# Patient Record
Sex: FEMALE | Race: WHITE | HISPANIC OR LATINO | Employment: FULL TIME | URBAN - METROPOLITAN AREA
[De-identification: names, ages, dates, MRNs, and addresses within clinical notes are randomized per-mention and may not be internally consistent; named-entity substitution may affect disease eponyms.]

---

## 2017-10-04 ENCOUNTER — OFFICE VISIT (OUTPATIENT)
Dept: URGENT CARE | Facility: PHYSICIAN GROUP | Age: 52
End: 2017-10-04
Payer: COMMERCIAL

## 2017-10-04 ENCOUNTER — HOSPITAL ENCOUNTER (EMERGENCY)
Facility: MEDICAL CENTER | Age: 52
End: 2017-10-04
Attending: EMERGENCY MEDICINE
Payer: COMMERCIAL

## 2017-10-04 ENCOUNTER — APPOINTMENT (OUTPATIENT)
Dept: RADIOLOGY | Facility: MEDICAL CENTER | Age: 52
End: 2017-10-04
Attending: EMERGENCY MEDICINE
Payer: COMMERCIAL

## 2017-10-04 VITALS
SYSTOLIC BLOOD PRESSURE: 170 MMHG | HEART RATE: 87 BPM | BODY MASS INDEX: 23.36 KG/M2 | RESPIRATION RATE: 18 BRPM | OXYGEN SATURATION: 96 % | WEIGHT: 111.77 LBS | TEMPERATURE: 98.6 F | DIASTOLIC BLOOD PRESSURE: 98 MMHG

## 2017-10-04 VITALS
WEIGHT: 113 LBS | TEMPERATURE: 98.7 F | HEIGHT: 58 IN | DIASTOLIC BLOOD PRESSURE: 114 MMHG | BODY MASS INDEX: 23.72 KG/M2 | HEART RATE: 79 BPM | RESPIRATION RATE: 16 BRPM | SYSTOLIC BLOOD PRESSURE: 152 MMHG | OXYGEN SATURATION: 99 %

## 2017-10-04 DIAGNOSIS — R51.9 NEW ONSET HEADACHE: ICD-10-CM

## 2017-10-04 DIAGNOSIS — R03.0 TRANSIENT ELEVATED BLOOD PRESSURE: ICD-10-CM

## 2017-10-04 DIAGNOSIS — I16.0 HYPERTENSIVE URGENCY: ICD-10-CM

## 2017-10-04 DIAGNOSIS — R51.9 NONINTRACTABLE HEADACHE, UNSPECIFIED CHRONICITY PATTERN, UNSPECIFIED HEADACHE TYPE: ICD-10-CM

## 2017-10-04 PROCEDURE — 700111 HCHG RX REV CODE 636 W/ 250 OVERRIDE (IP): Performed by: EMERGENCY MEDICINE

## 2017-10-04 PROCEDURE — 99203 OFFICE O/P NEW LOW 30 MIN: CPT | Performed by: NURSE PRACTITIONER

## 2017-10-04 PROCEDURE — 99284 EMERGENCY DEPT VISIT MOD MDM: CPT

## 2017-10-04 PROCEDURE — 70450 CT HEAD/BRAIN W/O DYE: CPT

## 2017-10-04 PROCEDURE — 96375 TX/PRO/DX INJ NEW DRUG ADDON: CPT

## 2017-10-04 PROCEDURE — 96374 THER/PROPH/DIAG INJ IV PUSH: CPT

## 2017-10-04 RX ORDER — ONDANSETRON 2 MG/ML
4 INJECTION INTRAMUSCULAR; INTRAVENOUS ONCE
Status: COMPLETED | OUTPATIENT
Start: 2017-10-04 | End: 2017-10-04

## 2017-10-04 RX ADMIN — ONDANSETRON 4 MG: 2 INJECTION INTRAMUSCULAR; INTRAVENOUS at 13:59

## 2017-10-04 RX ADMIN — HYDROMORPHONE HYDROCHLORIDE 1 MG: 1 INJECTION, SOLUTION INTRAMUSCULAR; INTRAVENOUS; SUBCUTANEOUS at 13:59

## 2017-10-04 ASSESSMENT — ENCOUNTER SYMPTOMS
SORE THROAT: 0
NAUSEA: 1
COUGH: 0
DIZZINESS: 0
SENSORY CHANGE: 0
VOMITING: 0
SEIZURES: 0
SINUS PRESSURE: 0
BLURRED VISION: 0
CHILLS: 0
PHOTOPHOBIA: 0
SPEECH CHANGE: 1
FEVER: 0
HEADACHES: 1
RHINORRHEA: 1
MIGRAINE HEADACHES: 0

## 2017-10-04 ASSESSMENT — LIFESTYLE VARIABLES: DO YOU DRINK ALCOHOL: NO

## 2017-10-04 NOTE — DISCHARGE INSTRUCTIONS
Hipertensión arterial  (Arterial Hypertension)  La hipertensión arterial (presión sanguínea elevada) es un trastorno que consiste en la elevación de la presión dentro de loly vasos sanguíneos. La hipertensión, con el correr del tiempo, favorece el riesgo de padecer ictus, infartos o insuficiencias cardíacas. También es juan de las causas principales de insuficiencia renal (del riñón).   CAUSAS  · En adultos  Más del 90% de todos los casos de hipertensión no tienen causas conocidas. [facility] se denomina hipertensión esencial o primaria. En el 10% restante de personas con hipertensión, el aumento en la presión arterial es causado por otras enfermedades. [facility] se denomina hipertensión secundaria. LAS CAUSAS MÁS IMPORTANTES DE HIPERTENSIÓN SECUDARIA SON:  · Abuso en el consumo de bebidas alcohólicas.  · Apnea del sueño obstructiva.  · Hiperaldosteronismo (Síndrome de [place]).  · Uso de esteroides.  · Insuficiencia renal crónica.  · Hiperparatiroidismo.  · Medicamentos.  · Estenosis de la arteria renal.  · Feocromocitoma.  · Enfermedad de [place].  · Coartación de la aorta.  · Crisis de esclerodermia renal.  · Regaliz (en cantidades excesivas).  · Drogas (cocaína, metanfetamina).  El profesional que lo asiste le explicará los puntos mencionados arriba que pueden estar referidos a usted.  · En niños  La hipertensión secundaria es la más común y siempre debe ser considerada radha causa.  · Embarazo  Pocas mujeres en edad de procrear tienen dena presión arterial. Sin embargo, hasta el 10% de ellas desarrollan hipertensión inducida por el embarazo. En general, esto no perjudicará a la patricia (roshni). Puede ser un síntoma de 3 complicaciones del embarazo: preeclampsia, síndrome HELLP (por loly siglas en inglés) y eclampsia. Es necesario realizar un seguimiento y el control con medicación.  SÍNTOMAS  · Esta enfermedad normalmente no produce ningún síntoma perceptible. Generalmente se descubre en un examen de rutina.  · La hipertensión  "maligna (hipertensión acelerada) es un problema (complicación) tardía de la presión arterial elevada. Puede tener los siguientes síntomas:  · Dolor de ricardo.  · Visión borrosa.  · Daño en órganos sarahi (significa que los riñones, el corazón, los pulmones y otros órganos se están dañando).  · Las situaciones de estrés pueden aumentar la presión arterial. Si juan persona con presión arterial normal tiene un aumento en daly presión en el momento en que es controlada por el profesional, se denomina \"hipertensión de guardapolvo hall\". Daly gravedad no se conoce. Puede estar relacionado con un desarrollo futuro de hipertensión, o con complicaciones de la hipertensión.  · La hipertensión generalmente se confunde con tensión intelectual, estrés y ansiedad.  DIAGNÓSTICO  El diagnóstico se realiza mediante 3 mediciones de presión sanguínea individuales. Se kristie con juan diferencia de al menos juan semana entre cada juan. Si hay un órgano dañado por causa de la hipertensión, el diagnóstico puede realizarse sin repetir las mediciones.  La hipertensión normalmente se identifica (diagnostica) si se obtienen los siguientes niveles:  · Más de 140/90 mm Hg medidos en ambos brazos, en 3 mediciones distintas, a lo austen de dos semanas.  · Más de 130/80 mm Hg debe considerarse un factor de riesgo y puede requerir tratamiento en pacientes con diabetes.  La presión arterial de más de 120/80 mm Hg se denomina \"pre-hipertensión\", aún en pacientes no diabéticos.  Para obtener juan medición precisa de la presión sanguínea, siga las siguientes indicaciones. Sea consciente de los factores que pueden alterar las mediciones de presión sanguínea.  · [place] la presión al menos juan hora luego de consumir cafeína.  · [place] la presión 30 minutos luego de fumar y sin estrés. Zulma es otro motivo para dejar de fumar: aumenta la presión arterial.  · Use un manguito de tamaño adecuado. Consulte al profesional que lo asiste si no está seguro acerca del tamaño que " "le corresponde.  · La mayor parte de los tensiómetros hogareños son automáticos. Medirán la presión sistólica y diastólica. La presión sistólica es la que se mide al comienzo de los sonidos. La presión diastólica es la presión en la que los sonidos desaparecen. Si usted es anciano, mida distintas presiones en distintas posiciones. Intente sentado, acostado o parado.  · Siéntese y descanse por al menos 5 minutos antes de jonathan las mediciones.  · No debería jonathan medicamentos radha descongestionantes (estimulantes adrenérgicos) antes de jonathan la medición. Estos se encuentran en muchos medicamentos para el resfrío.  · [place] nota de las mediciones de daly presión sanguínea e infórmeselas al profesional que lo asiste.  Si usted tiene hipertensión:  · El profesional que lo asiste podrá realizar exámenes para asegurarse de que no tenga hipertensión secundaria (rangel \"causas\", arriba).  · El profesional que lo asiste también podrá buscar síntomas de Síndrome Metabólico. También se denomina Síndrome X o Síndrome de Insulinorresistencia. Podrá tener renae síndrome si, además de hipertensión, tiene diabetes del tipo 2, obesidad abdominal, y lípidos sanguíneos anormales.  · El profesional que lo asiste tomará daly historia médica y familiar y realizará un examen físico.  · Las pruebas de diagnóstico pueden incluir exámenes de julia (de glucosa, colesterol, potasio, y función del riñón), un análisis de orina, o un electrocardiograma. Puede ser necesario realizar otras pruebas según daly jorge particular.  PREVENCIÓN  Existen algunos importantes consejos relacionados con daly estilo de venkatesh que puede adoptar para reducir loly probabilidades de desarrollar hipertensión.  · Mantenga un peso normal.  · Limite la cantidad de sal (sodio) en daly dieta.  · Ejercítese regularmente.  · Limite el consumo de alcohol.  · Lleve juan dieta con suficiente potasio. [place] consejos específicos con el profesional que lo asiste.  · Pretty un dieta DASH ( dieta que " ayuda a frenar la hipertensión por loly siglas en inglés). Esta dieta es maynor en frutas, vegetales, y productos lácteos descremados, y luis ciertos tipos de grasas.  PRONÓSTICO:  La hipertensión esencial no puede curarse. Los cambios en el estilo de venkatesh y el tratamiento médico pueden disminuir la presión sanguínea y reducir las complicaciones. El pronóstico de la hipertensión secundaria depende de la causa subyacente. Muchas personas controlan daly hipertensión con medicamentos o cambios en daly estilo de venkatesh y de esta forma pueden vivir juan venkatesh normal y saludable.   RIESGOS Y COMPLICACIONES.  Mientras la presión arterial dena en sí misma no es juan enfermedad, a menudo requiere tratamiento debido a los efectos a austen y corto plazo que tiene sobre muchos órganos. La hipertensión aumenta el riesgo de padecer:  · ACV o ictus (accidentes cerebrovasculares)  · Insuficiencia cardíaca debido a la presión arterial elevada crónica (Cardiomiopatía hipertensiva).  · Ataques cardíacos (infarto del miocardio).  · Lesión en la retina (retinopatía hipertensiva).  · Insuficiencia renal (nefropatía hipertensiva).  El profesional que lo asiste le explicará los puntos de esta lista que pueden estar referidos a usted. El tratamiento de la hipertensión puede reducir significativamente el riesgo de complicaciones.  TRATAMIENTO  · Se recomienda la pérdida de peso para los pacientes excedidos y la práctica regular de ejercicio. El buen estado físico reduce la presión arterial.  · La hipertensión leve generalmente se trata con dieta y ejercicio. Juan dieta maynor en frutas y vegetales, lácteos descremados y alimentos bajos en grasas y sal (sodio) pueden ayudar a disminuir la presión arterial. La disminución del consumo de sal disminuye la presión arterial en un tercio de las personas.  · Si es fumador, abandone el hábito.  Estos pasos son muy efectivos para reducir la presión arterial. Estas acciones a seguir son fáciles de sugerir sarah  "difíciles de llevar a cabo. La mayoría de los pacientes con hipertensión moderada o grave finalmente necesita medicamentos para retornar la presión a niveles normales. Hay varios tipos de medicamentos que se emplean en el tratamiento. Las pastillas para la presión arterial (antihipertensivos) la disminuyen a través de loly diferentes acciones. La disminución de la presión arterial en 10 mm Hg puede disminuir el riesgo de complicaciones en un 25 %.  El objetivo del tratamiento es el control efectivo de la presión arterial. [place] reducirá el riesgo de complicaciones. El profesional que lo asiste le ayudará a determinar el mejor tratamiento para usted, de acuerdo con daly estilo de venkatesh. Lo que puede ser un excelente tratamiento para juan persona, puede no serlo para otra.  INSTRUCCIONES PARA EL CUIDADO DOMICILIARIO  · NO fume.  · Adapte daly estilo de venkatseh de acuerdo a las indicaciones de la sección \"Prevención.\"  · Si está tomando medicamentos, siga las indicaciones cuidadosamente. Los medicamentos para la presión arterial deben tomarse según fueron prescritos. Saltear dosis reduce loly beneficios. También lo pone en riesgo de padecer complicaciones.  · Concurra a las consultas de seguimiento con el profesional que lo asiste, según le haya indicado.  · Si se le solicita que controle daly presión arterial en daly hogar, siga las anteriores indicaciones de la sección \"diagnóstico.\"  SOLICITE ATENCIÓN MÉDICA SI:  · Adilia que está sufriendo efectos secundarios de la medicación.  · Siente sandi de ricardo recurrentes o se siente mareado.  · Siente hinchazón en loly tobillos.  · Tiene problemas visuales.  SOLICITE ATENCIÓN MÉDICA DE INMEDIATO SI:  · Comienza súbitamente a sentir dolor o presión en el pecho, dificultad para respirar, u otros síntomas de ataque cardíaco.  · Sufre juan cefalea grave.  · Tiene síntomas de ataque cardíaco (debilidad repentina, dificultad para hablar, dificultad para caminar).  ESTÉ SEGURO QUE:   · Comprende " "las instrucciones para el dena médica.  · Controlará daly enfermedad.  · Solicitará atención médica de inmediato según las indicaciones.  Document Released: 12/18/2006 Document Revised: 03/11/2013  [employer]® Patient Information ©2014 [employer].      Dolor de ricardo, preguntas frecuentes y loly respuestas  (Headaches, Frequently Asked Questions)  CEFALEAS MIGRAÑOSAS  P: ¿Qué es la migraña? ¿Qué la ocasiona? ¿Cómo puedo tratarla?  R: En general, la migraña comienza radha un dolor apagado. Luego progresa hacia un dolor, ansley, punzante y radha un latido. Sentirá renae dolor en las sienes. Podrá sentir dolor en la parte anterior o posterior de la ricardo, o en avery o ambos lados. El dolor suele estar acompañado de juan combinación de:  · Náuseas.   · Vómitos.   · Sensibilidad a la quintin y los ruidos.   Algunas personas (un 15%) experimentan un aura (rangel abajo) antes de un ataque. La causa de la migraña se debe a reacciones químicas del cerebro. El tratamiento para la migraña puede incluir medicamentos de venta jakob. También puede incluir técnicas de autoayuda. Estas incluyen entrenamientos para la relajación y biorretroalimentación.   P: ¿Qué es un aura?  R: Alrededor del 15% de las personas con migraña tiene un \"aura\". Es juan señal de síntomas neurológicos que ocurren antes de un dolor de ricardo por migrañas. Podrá rangel líneas onduladas o irregulares, puntos o luces parpadeantes. Podrá experimentar visión de túnel o puntos ciegos en avery o ambos ojos. El aura puede incluir alucinaciones visuales o auditivas (algo que se imagina). Puede incluir trastornos en el olfato (radha olores extraños), el tacto o el gusto. Entre otros síntomas se incluyen:  · Adormecimiento.   · Sensación de hormigueo.   · Dificultad para recordar o decir la palabra correcta.   Estos episodios neurológicos pueden durar hasta 60 minutos. Los síntomas desaparecerán a medida que el dolor de ricardo comience.  P:¿Qué es un disparador?  R: Ciertos factores " "físicos o ambientales pueden llevar a \"disparar\" juan migraña. Estos son:  · Alimentos.   · Cambios hormonales.   · Clima.   · Estrés.   Es importante recordar que los disparadores son diferentes entre si. Para ayudar a prevenir ataques de migrañas, necesitará descubrir cuáles son los disparadores que le afectan. Lleve un diario sobre loly sandi de ricardo. Zulma es un buen modo para descubrir los disparadores. El diario le ayudará en el momento de hablar con el profesional acerca de daly enfermedad.  P: ¿El clima afecta en las migrañas?  R: La quintin solar, el calor, la humedad y lo cambios drásticos en la presión barométrica pueden llevar a, o \"disparar\" un ataque de migraña en algunas personas. Frida estudios [place] demostrado que el clima no actúa radha disparador para todas las personas con migraña.  P: ¿Cuál es la relación entre la migraña y la hormonas?  R: Las hormonas inician y regulan muchas de las funciones corporales. Las hormonas mantienen el balance en el cuerpo dentro de los constantes cambios de ambiente. Algunas veces, el nivel de hormonas en el cuerpo se desbalancea. Por ejemplo, cedric la menstruación, el embarazo o la menopausia. Pueden ser la causa de un ataque de migraña. De hecho, alrededor de america cuartos de las mujeres con migraña informan que loly ataques están relacionados con el ciclo menstrual.   P: ¿Aumenta el riesgo de sufrir un choque cardíaco en las personas que padecen migraña?  R: La probabilidad de que un ataque de migraña ocasione un ataque cardíaco es muy remota. [place] no quiere decir que juan persona que sufre de migraña no pueda tener un ataque cardíaco asociado con [place]. En las personas menores de 40 años, el factor más común para un ataque es la migraña. Frida cedric la venkatesh de juan persona, la ocurrencia de un dolor de ricardo por migraña está asociada con juan reducción en el riesgo de morir por un ataque cerebrovascular.   P: ¿Cuáles son los medicamentos para la migraña?  R: La medicación " "precisa se utiliza para tratar el dolor de ricardo juan vez que ha comenzado. Son ejemplos, medicamentos de venta jakob, desinflamatorios sin esteroides, ergotamínicos y triptanos.   P: ¿Qué son los triptanos?  R: Lo triptanos son juan nueva clase de medicamentos abortivos. Son específicos para tratar renae problema. Los triptanos son vasoconstrictores. Moderan algunas reacciones químicas del cerebro. Los triptanos trabajan radha receptores del cerebro. Ayudan a restaurar el balance de un neurotransmisor denominado serotonina. Se queenie que las fluctuaciones en los niveles de serotonina son la causa principal de la migraña.   P: ¿Son efectivos los medicamentos de venta jakob para la migraña?  R: Los medicamentos de venta jakob pueden ser efectivos para aliviar sandi leves a moderados y los síntomas asociados a la migraña. Frida deberá consultar a un médico antes de comenzar cualquier tratamiento para la migraña.   P: ¿Cuáles son los medicamentos de prevención de la migraña?  R: Se suele denominar tratamiento \"profiláctico\" a los medicamentos para la prevención de la migraña. Se utilizan para reducir la frecuencia, gravedad y duración de los ataques de migraña. Son ejemplos de medicamentos de prevención: antiepilépticos, antidepresivos, bloqueadores beta, bloqueadores de los zepeda de calcio y medicamentos antiinflamatorios sin esteroides.  P: ¿ Por qué se utilizan anticonvulsivantes para tratar la migraña?  R: Christine los últimos años, ha habido un creciente interés en las drogas antiepilépticas para la prevención de la migraña. A menudo se los conoce radha \"anticonvulsivantes\". La epilepsia y la migraña suceden por reacciones similares en el cerebro.   P: ¿ Por qué se utilizan antidepresivos para tratar la migraña?  R: Los antidepresivos típicamente se utilizan para tratar a las personas con depresión. Pueden reducir la frecuencia de la migraña a través de la regulación de los niveles químicos, radha la serotonina, en el " "cerebro.   P: ¿ Por qué se utilizan terapias alternativas para tratar la migraña?  R: El término \"terapias alternativas\" suelen utilizarse para describir los tratamientos que se considera que están por fuera de alcance la medicina occidental convencional. Son ejemplos de las terapias alternativas: la acupuntura, la acupresión y el yoga. Otra terapia alternativa común es la terapia herbal. Se queenie que algunas hierbas ayudan a aliviar los sandi de nikole. Siempre consulte con el profesional acerca de las terapias alternativas antes de utilizarlas. Algunos productos herbales contienen arsénico y otras toxinas.  SANDI DE NIKOLE POR TENSIÓN  P: ¿Qué es un dolor de nikole por tensión? ¿Qué lo ocasiona? ¿Cómo puedo tratarlo?  R: Los sandi de nikole por tensión ocurren al [place]. A menudo son el resultado de estrés temporario, ansiedad, fatiga o emilee. Los síntomas incluyen dolor en las sienes, juan sensación radha de tener juan parr alrededor de la nikole (un dolor que \"presiona\"). Los síntomas pueden incluir juan sensación de empuje, de presión y contracción de los músculos de la nikole y el myrna. El dolor comienza en la frente, sienes o en la parte posterior de la nikole y el myrna. El tratamiento para los sandi de nikole por tensión puede incluir medicamentos de venta jakob. También puede incluir técnicas de autoayuda con entrenamientos para la relajación y biorretroalimentación.  CEFALEA EN RACIMOS  P: ¿Qué es juan cefalea en racimos? ¿Qué la ocasiona? ¿Cómo puedo tratarla?  R: La cefalea en racimos angela daly nombre debido a que los ataques vienen en grupos. El dolor aparece con poco o ningún aviso. Normalmente ocurre de un lado de la nikole. Muchas veces el dolor viene acompañado de un lagrimeo u denisha haro y goteo de la nariz del mismo lado que el dolor. Se queenie que la causa es juan reacción en las sustancias químicas del cerebro. Se describe radha el jorge más grave e intenso de cualquier tipo de dolor de nikole. El " "tratamiento incluye medicamentos bajo receta y oxígeno.  CEFALEA SINUSAL  P: ¿Qué es juan cefalea sinusal? ¿Qué la ocasiona? ¿Cómo puedo tratarla?  R: Cuando se inflama juan cavidad en los huesos de la xiomara y el cráneo (sinus) ocasiona un dolor localizado. Esta enfermedad generalmente es el resultado de juan reacción alérgica, un tumor o juan infección. Si el dolor de nikole está ocasionado por un bloqueo del sinus, radha juan infección, probablemente tendrá fiebre. Juan imagen de gisel X confirmará el bloqueo del sinus. El tratamiento indicado por el médico podrá incluir antibióticos para la infección, y también antihistamínicos o descongestivos.   DOLOR DE NIKOLE POR EFECTO \"REBOTE\"  P: ¿Qué es un dolor de nikole por efecto \"rebote\"? ¿Qué lo ocasiona? ¿Cómo puedo tratarlo?  R: Si se kristie medicamentos para el dolor de nikole muy a menudo puede llevar a la enfermedad conocida radha \"dolor de nikole por rebote\". Un patrón de abuso de medicamentos para el dolor de nikole supone tomarlos más de dos veces por semana o en cantidades excesivas. [place] significa más que lo que indica el envase o el médico. Con los sandi de nikole por rebote, los medicamentos no sólo long de aliviar el dolor sino que además comienzan a ocasionar sandi de nikole. Los médicos tratan los sandi de nikole por rebote mediante la disminución del medicamento del que se ha abusado. A veces el medico podrá sustituir gradualmente por un tipo diferente de tratamiento o medicación. Dejar de consumirlo podría ser difícil. El abuso regular de un medicamento aumenta el potencial que se produzcan efectos secundarios graves. Consulte con un médico si utiliza regularmente medicamentos para el dolor de nikole más de dos días por semana o más de lo que indica el envase.  PREGUNTAS Y RESPUESTAS ADICIONALES  P: ¿Qué es la biorretroalimentación?  R: La biorretroalimentación es un tratamiento de autoayuda. La biorretroalimentación utiliza un equipamiento especial " para controlar los movimientos involuntarios del cuerpo y las respuestas físicas. La biorretroalimentación controla:  · Respiración.   · Pulso.   · Latidos cardíacos.   · Temperatura.   · Tensión muscular.   · Actividad cerebrales.   La biorretroalimentación le ayudará a mejorar y perfeccionar loly ejercicios de relajación. Aprenderá a controlar las respuestas físicas relacionadas con el estrés. Juan vez que se dominan las técnicas no necesitará más el equipamiento.  P: ¿Son hereditarios los sandi de ricardo?  R: Según algunas estimaciones, aproximadamente 28 millones de estadounidenses sufren migraña. Cuatro de cada josh (80%) informan juan historia familiar de migraña. Los investigadores no pueden asegurar si se trata de un problema genético o juan predisposición familiar. A pesar de esto, un lisa tiene 50% de probabilidades de sufrir migraña si avery de loly padres la sufre. El lisa tiene un 75% de probabilidades si ambos padres la sufren.   P. ¿Puede un lisa tener migraña?  R: En el momento de ingresar a la escuela secundaria, la mayoría de los jóvenes [place] experimentado algún tipo de cefalea. Algunos abordajes o medicamentos seguros y efectivos pueden evitar las cefaleas o detenerlas luego de que [place] comenzado.   P. ¿Qué tipo de especialista debe rangel para diagnosticar y tratar juan cefalea?  R: Comience con daly médico de cabecera. [place] acerca de daly experiencia y abordaje de las cefaleas. Comente los métodos de clasificación, diagnóstico y tratamiento. El profesional decidirá si lo derivará a un especialista, según los síntomas u otras enfermedades. El hecho de sufrir diabetes, alergias, etc, puede requerir un abordaje más complejo. La [employer] ([employer] para las Cefaleas) proporcionará, a pedido, juan lista de los médicos que son miembros de daly estado.  Document Released: 11/30/2009 Document Revised: 03/11/2013  [employer]® Patient Information ©2013 [employer].

## 2017-10-04 NOTE — ED NOTES
Pt to triage with family .  Chief Complaint   Patient presents with   • Head Ache     x 1wk denies hst of headaches or injury    • Nausea   denies hst of htn

## 2017-10-04 NOTE — ED PROVIDER NOTES
ED Provider Note    Scribed for Thee Boyce M.D. by Puneet Murguia. 10/4/2017  1:08 PM    Primary care provider: Pcp Pt States None  Means of arrival: Walk-in  History obtained from: Patient  History limited by: None    CHIEF COMPLAINT  Chief Complaint   Patient presents with   • Head Ache     x 1wk denies hst of headaches or injury    • Nausea     HPI  Thuy Humphries is a 52 y.o. female who presents to the Emergency Department for worsened waxing and waning frontal headache onset 2 weeks ago. The patient also reports of pain radiating down the left neck. She has associated nausea, but denies any vomiting, chest pain, or abdominal pain. Per family member, the patient was found to be hypertensive in 2015, but was never medicated for pressure management.     REVIEW OF SYSTEMS  Pertinent positives include headache and nausea.   Pertinent negatives include no vomiting, chest pain, or abdominal pain.    All other systems reviewed and negative.    C.     PAST MEDICAL HISTORY  Hypertension.     SURGICAL HISTORY   has a past surgical history that includes gyn surgery.    SOCIAL HISTORY  Social History   Substance Use Topics   • Smoking status: Never Smoker   • Smokeless tobacco: Never Used   • Alcohol use Yes      Comment: 1 daily      History   Drug Use No     FAMILY HISTORY  History reviewed. No pertinent family history.    CURRENT MEDICATIONS  Home Medications     Reviewed by Tabitha Vallejo R.N. (Registered Nurse) on 10/04/17 at 1252  Med List Status: Complete   Medication Last Dose Status        Patient Joon Taking any Medications                       ALLERGIES  No Known Allergies    PHYSICAL EXAM  VITAL SIGNS: BP (!) 170/98 Comment: 189/115 right arm  Pulse (!) 102   Temp 37 °C (98.6 °F) (Temporal)   Resp 18   Wt 50.7 kg (111 lb 12.4 oz)   SpO2 96%   BMI 23.36 kg/m²     Nursing note and vitals reviewed.  Constitutional: Well-developed and well-nourished.   HENT: Head is normocephalic and  atraumatic. Oropharynx is clear and moist without exudate or erythema.   Eyes: Pupils are equal, round, and reactive to light. Conjunctiva are normal.   Cardiovascular: Normal rate and regular rhythm. No murmur heard. Normal radial pulses.  Pulmonary/Chest: Breath sounds normal. No wheezes or rales.   Abdominal: Soft and non-tender. No distention    Musculoskeletal: Extremities exhibit normal range of motion without edema or tenderness.   Neurological: Awake, alert and oriented to person, place, and time. No focal deficits noted.  Skin: Skin is warm and dry. No rash.   Psychiatric: Normal mood and affect. Appropriate for clinical situation    DIAGNOSTIC STUDIES / PROCEDURES    RADIOLOGY  CT-HEAD W/O   Final Result      Head CT without contrast within normal limits. No evidence of acute cerebral infarction, hemorrhage or mass lesion.   Low lying cerebellar tonsils.      The radiologist's interpretation of all radiological studies have been reviewed by me.    COURSE & MEDICAL DECISION MAKING  Nursing notes, VS, PMSFHx reviewed in chart.     1:08 PM - Patient seen and examined at bedside. Patient will be treated with Dilaudid 1mg and Zofran 4mg. Ordered CT-head to evaluate her symptoms to rule out intracranial bleed due to elevated blood pressure.     2:06 PM Reviewed the patient's CT result as shown above. Repeat BP shows 134/68.     2:13 PM Patient was reevaluated at bedside. She is feeling significantly improved. Discussed radiology results with the patient and informed them of the results shown above. She has agreed to follow up for blood pressure recheck in 2 days. The patient will be discharged and should return if symptoms worsen or if new symptoms arise. The patient understands and agrees to plan.      The patient is referred to a primary physician for blood pressure management, diabetic screening, and for all other preventative health concerns.    The patient was discharged home with an information sheet on  Arterial Hypertension and told to return immediately for any signs or symptoms listed.  The patient agreed to the discharge precautions and follow-up plan which is documented in EPIC.     DISPOSITION:  Patient will be discharged home in stable condition.    FOLLOW UP:  [facility], Emergency Dept  [address] [id number] [phone]    If symptoms worsen    FINAL IMPRESSION  1. Nonintractable headache, unspecified chronicity pattern, unspecified headache type    2. Transient elevated blood pressure       Puneet DOVE (Maria E), am scribing for, and in the presence of, Thee Boyce M.D..    Electronically signed by: Puneet Murguia (Maria E), 10/4/2017    Thee DOVE M.D. personally performed the services described in this documentation, as scribed by Puneet Murguia in my presence, and it is both accurate and complete.    The note accurately reflects work and decisions made by me.  Thee Boyce  10/4/2017  3:18 PM

## 2017-10-04 NOTE — PROGRESS NOTES
"Subjective:      Thuy Humphries is a 52 y.o. female who presents with Headache (x1 week. Pt complains of a constant headache, visual aura, light and sound sensitivity. )            Medications, Allergies and Prior Medical Hx reviewed and updated in Psychiatric.with patient/family today         Headache    This is a new problem. The current episode started 1 to 4 weeks ago (10 days). The problem occurs constantly. The problem has been unchanged. The pain is located in the occipital region. The pain radiates to the left neck. The pain quality is not similar to prior headaches. The quality of the pain is described as sharp. The pain is at a severity of 10/10. Associated symptoms include nausea and rhinorrhea. Pertinent negatives include no blurred vision, coughing, dizziness, fever, photophobia, seizures, sinus pressure, sore throat or vomiting. Nothing aggravates the symptoms. She has tried NSAIDs for the symptoms. There is no history of migraine headaches or recent head traumas.       Review of Systems   Constitutional: Negative for chills, fever and malaise/fatigue.   HENT: Positive for rhinorrhea. Negative for sinus pressure and sore throat.    Eyes: Negative for blurred vision and photophobia.   Respiratory: Negative for cough.    Gastrointestinal: Positive for nausea. Negative for vomiting.   Neurological: Positive for speech change (pt states she is having difficulty speaking) and headaches. Negative for dizziness, sensory change and seizures.          Objective:     /114   Pulse 79   Temp 37.1 °C (98.7 °F)   Resp 16   Ht 1.473 m (4' 10\")   Wt 51.3 kg (113 lb)   SpO2 99%   Breastfeeding? No   BMI 23.62 kg/m²      Physical Exam   Constitutional: She is oriented to person, place, and time. She appears well-developed and well-nourished. No distress.   HENT:   Head: Normocephalic and atraumatic.   Eyes: Conjunctivae are normal. Pupils are equal, round, and reactive to light.   Neck: Neck supple. "   Cardiovascular: Normal rate, regular rhythm and normal heart sounds.    Pulmonary/Chest: Effort normal and breath sounds normal. No respiratory distress.   Neurological: She is alert and oriented to person, place, and time. She exhibits normal muscle tone. Gait normal.   Awake, alert, answering questions appropriately, moving all extremities strong and equal, no facial droop, no arm drift, finger nose intact,    Skin: Skin is warm and dry. Capillary refill takes less than 2 seconds. No rash noted.   Psychiatric: She has a normal mood and affect. Her behavior is normal.   Vitals reviewed.              Assessment/Plan:       1. New onset headache     2. Hypertensive urgency         D/w pt/family recommendation to transfer to ED for evaluation and treatment not available at this facility, they verbalize agreement and request [facility]  D/w Dr PAU Israel  who accepted patient   Pt will be transported via pvt vehicle

## 2018-02-19 ENCOUNTER — HOSPITAL ENCOUNTER (EMERGENCY)
Facility: MEDICAL CENTER | Age: 53
End: 2018-02-19
Attending: EMERGENCY MEDICINE
Payer: COMMERCIAL

## 2018-02-19 VITALS
OXYGEN SATURATION: 98 % | WEIGHT: 118.39 LBS | HEART RATE: 77 BPM | RESPIRATION RATE: 16 BRPM | TEMPERATURE: 98.2 F | HEIGHT: 55 IN | BODY MASS INDEX: 27.4 KG/M2 | SYSTOLIC BLOOD PRESSURE: 147 MMHG | DIASTOLIC BLOOD PRESSURE: 93 MMHG

## 2018-02-19 DIAGNOSIS — Z77.098 EXPOSURE TO CHEMICAL INHALATION: ICD-10-CM

## 2018-02-19 PROCEDURE — 99283 EMERGENCY DEPT VISIT LOW MDM: CPT

## 2018-02-19 ASSESSMENT — PAIN SCALES - GENERAL: PAINLEVEL_OUTOF10: 0

## 2018-02-19 ASSESSMENT — LIFESTYLE VARIABLES: DO YOU DRINK ALCOHOL: YES

## 2018-02-19 NOTE — LETTER
"  FORM C-4:  EMPLOYEE’S CLAIM FOR COMPENSATION/ REPORT OF INITIAL TREATMENT  EMPLOYEE’S CLAIM - PROVIDE ALL INFORMATION REQUESTED   First Name  Thuy Last Name  Kip Humphries Birthdate             Age  1965 52 y.o. Sex  female Claim Number   Home Employee Address  [address]                                     Zip  [address] Height  1.346 m (4' 5\") Weight  53.7 kg (118 lb 6.2 oz) [place]     Mailing Employee Address                           [address]               Zip  [address] Telephone  [phone] (home)  Primary Language Spoken  ENGLISH   Insurer  ***[employer] Third Party   [employer] Employee's Occupation (Job Title) When Injury or Occupational Disease Occurred   [employer]     Employer's Name  [employer] Telephone  [phone]    Employer Address  [address] [address]] Zip  [address]   Date of Injury  2/19/2018       Hour of Injury  2:30 PM Date Employer Notified  2/19/2018 Last Day of Work after Injury or Occupational Disease  2/19/2018 Supervisor to Whom Injury Reported  Shashank   Address or Location of Accident (if applicable)  [address]   What were you doing at the time of accident? (if applicable)  walking into [employer] closet.    How did this injury or occupational disease occur? Be specific and answer in detail. Use additional sheet if necessary)  walked into  closet to find a mop that had some sort of aeromatic chemical in it. Somebody used mop to clean up something and put it back without rinsing it out. I breathed the vapors and started coughing.   If you believe that you have an occupational disease, when did you first have knowledge of the disability and it relationship to your employment?  n/a Witnesses to the Accident  [facility]     Nature of Injury or Occupational Disease  Asphyxiation  Part(s) of Body Injured or Affected  Nose, " Trachea,     I certify that the above is true and correct to the best of my knowledge and that I have provided this information in order to obtain the benefits of [place] or [place]).  I hereby authorize any physician, chiropractor, surgeon, practitioner, or other person, any hospital, including [facility] or [facility], any medical service organization, any insurance company, or other institution or organization to release to each other, any medical or other information, including benefits paid or payable, pertinent to this injury or disease, except information relative to diagnosis, treatment and/or counseling for AIDS, psychological conditions, alcohol or controlled substances, for which I must give specific authorization.  A Photostat of this authorization shall be as valid as the original.   Date   02/19/2018 [facility] Employee’s Signature   THIS REPORT MUST BE COMPLETED AND MAILED WITHIN 3 WORKING DAYS OF TREATMENT   Place  [facility], EMERGENCY DEPT  Name of Facility   [facility]   Date  2/19/2018 Diagnosis  (Z77.098) Exposure to chemical inhalation Is there evidence the injured employee was under the influence of alcohol and/or another controlled substance at the time of accident?   Hour  4:58 PM Description of Injury or Disease  Exposure to chemical inhalation No   Treatment  Follow up in occupational health  Have you advised the patient to remain off work five days or more?         No   X-Ray Findings    Comments:n/a   If Yes   From Date    To Date      From information given by the employee, together with medical evidence, can you directly connect this injury or occupational disease as job incurred?  Yes If No, is the employee capable of: Full Duty  Yes Modified Duty      Is additional medical care by a physician indicated?  Yes  Comments:occupational  "health follow up If Modified Duty, Specify any Limitations / Restrictions        Do you know of any previous injury or disease contributing to this condition or occupational disease?  No   Date  2/19/2018 Print Doctor’s Name  London Bee certify the employer’s copy of this form was mailed on:   Address  [address] [address] [phone] Insurer’s Use Only   [facility]  [address]    Provider’s Tax ID Number  [id number] Telephone  Dept: [phone]    Doctor’s Signature  e-LONDON Franklin M.D. Degree   MD    Original - TREATING PHYSICIAN OR CHIROPRACTOR   Pg 2-Insurer/TPA   Pg 3-Employer   Pg 4-Employee                                                                                                  Form C-4 (rev01/03)     BRIEF DESCRIPTION OF RIGHTS AND BENEFITS  (Pursuant to [place] 616C.050)    Notice of Injury or Occupational Disease (Incident Report Form C-1): If an injury or occupational disease (OD) arises out of and in the course of employment, you must provide written notice to your employer as soon as practicable, but no later than 7 days after the accident or OD. Your employer shall maintain a sufficient supply of the required forms.    Claim for Compensation (Form C-4): If medical treatment is sought, the form C-4 is available at the place of initial treatment. A completed \"Claim for Compensation\" (Form C-4) must be filed within 90 days after an accident or OD. The treating physician or chiropractor must, within 3 working days after treatment, complete and mail to the employer, the employer's insurer and third-party , the Claim for Compensation.    Medical Treatment: If you require medical treatment for your on-the-job injury or OD, you may be required to select a physician or chiropractor from a list provided by your workers’ compensation insurer, if it has contracted with an Organization for Managed Care (MCO) or Preferred Provider Organization (PPO) or " providers of health care. If your employer has not entered into a contract with an MCO or PPO, you may select a physician or chiropractor from the Panel of Physicians and Chiropractors. Any medical costs related to your industrial injury or OD will be paid by your insurer.    Temporary Total Disability (TTD): If your doctor has certified that you are unable to work for a period of at least 5 consecutive days, or 5 cumulative days in a 20-day period, or places restrictions on you that your employer does not accommodate, you may be entitled to TTD compensation.    Temporary Partial Disability (TPD): If the wage you receive upon reemployment is less than the compensation for TTD to which you are entitled, the insurer may be required to pay you TPD compensation to make up the difference. TPD can only be paid for a maximum of 24 months.    Permanent Partial Disability (PPD): When your medical condition is stable and there is an indication of a PPD as a result of your injury or OD, within 30 days, your insurer must arrange for an evaluation by a rating physician or chiropractor to determine the degree of your PPD. The amount of your PPD award depends on the date of injury, the results of the PPD evaluation and your age and wage.    Permanent Total Disability (PTD): If you are medically certified by a treating physician or chiropractor as permanently and totally disabled and have been granted a PTD status by your insurer, you are entitled to receive monthly benefits not to exceed 66 2/3% of your average monthly wage. The amount of your PTD payments is subject to reduction if you previously received a PPD award.    Vocational Rehabilitation Services: You may be eligible for vocational rehabilitation services if you are unable to return to the job due to a permanent physical impairment or permanent restrictions as a result of your injury or occupational disease.    Transportation and Per Niru Reimbursement: You may be  eligible for travel expenses and per casi associated with medical treatment.  Reopening: You may be able to reopen your claim if your condition worsens after claim closure.    Appeal Process: If you disagree with a written determination issued by the insurer or the insurer does not respond to your request, you may appeal to the Department of Administration, , by following the instructions contained in your determination letter. You must appeal the determination within 70 days from the date of the determination letter at [address], [address], [place] [address], or [address], [place], [place] [address]. If you disagree with the  decision, you may appeal to the Department of Administration, . You must file your appeal within 30 days from the date of the  decision letter at [address], [address], [place], [place] [address], or [address], [place], [place] [address]. If you disagree with a decision of an , you may file a petition for judicial review with the District Court. You must do so within 30 days of the Appeal Officer’s decision. You may be represented by an  at your own expense or you may contact the [facility] for possible representation.    [place]  for Injured Workers (NAIW): If you disagree with a  decision, you may request that NAIW represent you without charge at an  Hearing. For information regarding denial of benefits, you may contact the [facility] at: [address], [address], [place] [address], [phone], or [address], [place] [address], [phone]    To File a Complaint with the Division: If you wish to file a complaint with the  of the Division of Industrial Relations (DIR), please contact the Workers’ Compensation Section, [address], [address], [place],  [place] [address], telephone [phone]) [phone], or [address], [address], [place] [address], telephone [phone].    For assistance with Workers’ Compensation Issues: you may contact the Office of the Governor Consumer Health Assistance, [address] [place] [address], [address], [place], [place] [address], Toll Free 1-215.562.7624, Web site: http://[facility].nv., E-mail myriam@[facility].nv.                                                                                                                                                                               __________________________________________________________________                                    _________________            Employee Name / Signature                                                                                                                            Date                                       D-2 (rev. 10/07)

## 2018-02-19 NOTE — ED TRIAGE NOTES
Pt ambulates to triage, Taiwanese speaking,  line used Mark [id number]  Pt reports she was at work and was cleaning and inhaled fumes while mopping  Pt reports dry cough and that her throat feels tight since, pt reports this occurred around 1430 today  Pt speaks in full sentences and handling secretions but reports painful swallowing   Pt asked to wait in lobby, pt updated on triage process and pt asked to inform RN of any changes.

## 2018-02-20 NOTE — ED TRIAGE NOTES
.  Chief Complaint   Patient presents with   • Chemical Exposure   Agree with triage assessment.  Per family member, chemical may have been ammonia.  Pt c/o dysphagia and dry cough. Lungs BCTA.

## 2018-02-20 NOTE — ED PROVIDER NOTES
"ED Provider Note    CHIEF COMPLAINT  Chief Complaint   Patient presents with   • Chemical Exposure       HPI  Thuy Humphries is a 52 y.o. female who presents to the emergency department complaining of irritation after inhaling some cleaning chemicals. The patient says she was at work she was cleaning out a mop which was in some yellow cleaning solution and there was a very strong smell associated with this she felt a burning sensation in her lungs and throat and had some coughing. Symptoms began around 2:30 this afternoon this is mostly resolved although she does have some mild itching in her throat. The patient says she did not mix any chemicals together she does not know what cleaning chemicals were involved    REVIEW OF SYSTEMS she has not recently been ill the fever chills cough difficulty breathing sore throat.    PAST MEDICAL HISTORY  History reviewed. No pertinent past medical history.    FAMILY HISTORY  History reviewed. No pertinent family history.    SOCIAL HISTORY  Social History     Social History   • Marital status: Single     Spouse name: N/A   • Number of children: N/A   • Years of education: N/A     Social History Main Topics   • Smoking status: Never Smoker   • Smokeless tobacco: Never Used   • Alcohol use Yes      Comment: 1 daily   • Drug use: No   • Sexual activity: Not on file     Other Topics Concern   • Not on file     Social History Narrative   • No narrative on file       SURGICAL HISTORY  Past Surgical History:   Procedure Laterality Date   • GYN SURGERY      tubal ligation       CURRENT MEDICATIONS  Home Medications    **Home medications have not yet been reviewed for this encounter**         ALLERGIES  No Known Allergies    PHYSICAL EXAM  VITAL SIGNS: /93   Pulse 77   Temp 36.8 °C (98.2 °F)   Resp 16   Ht 1.346 m (4' 5\")   Wt 53.7 kg (118 lb 6.2 oz)   SpO2 98%   BMI 29.63 kg/m²    Oxygen saturation is interpreted as adequate  Constitutional: Awake well-appearing " individual in no distress she primarily speaks Andorran but family is interpreting for her  HENT: Mucous membranes are moist and throat clear  Eyes: No erythema or discharge or jaundice  Neck: Trachea midline no JVD  Cardiovascular: Regular rate and rhythm  Lungs: Clear and equal bilaterally with no apparent difficulty breathing  Skin: Warm and dry  Musculoskeletal: No acute bony deformity  Neurologic: Awake verbal and ambulatory    MEDICAL DECISION MAKING and DISPOSITION  In general the patient looks well at this time I do not think that she needs additional emergency intervention. I have filled out all of the fields of the C4 form for work injury and I have recommended that the patient follow-up in occupational health tomorrow for recheck. She may return here if she has new or worsening symptoms    IMPRESSION  1. Chemical inhalation      Electronically signed by: London Bee, 2/19/2018 8:15 PM

## 2018-02-20 NOTE — ED NOTES
All lines and monitors disconnected.  Discharge instructions reviewed, questions answered.  Pt to shannan, escorted by RN.  Pt states all belongings in possession.

## 2018-02-26 ENCOUNTER — NON-PROVIDER VISIT (OUTPATIENT)
Dept: URGENT CARE | Facility: PHYSICIAN GROUP | Age: 53
End: 2018-02-26

## 2018-02-26 DIAGNOSIS — Z11.1 PPD SCREENING TEST: ICD-10-CM

## 2018-02-26 PROCEDURE — 86580 TB INTRADERMAL TEST: CPT | Performed by: PHYSICIAN ASSISTANT

## 2018-02-28 ENCOUNTER — NON-PROVIDER VISIT (OUTPATIENT)
Dept: URGENT CARE | Facility: PHYSICIAN GROUP | Age: 53
End: 2018-02-28

## 2018-02-28 ENCOUNTER — APPOINTMENT (OUTPATIENT)
Dept: RADIOLOGY | Facility: IMAGING CENTER | Age: 53
End: 2018-02-28
Attending: NURSE PRACTITIONER
Payer: COMMERCIAL

## 2018-02-28 DIAGNOSIS — R76.11 POSITIVE PPD: Primary | ICD-10-CM

## 2018-02-28 DIAGNOSIS — Z11.1 TUBERCULOSIS SCREENING: ICD-10-CM

## 2018-02-28 PROCEDURE — 71045 X-RAY EXAM CHEST 1 VIEW: CPT | Mod: TC | Performed by: NURSE PRACTITIONER

## 2018-03-01 NOTE — PROGRESS NOTES
Subjective:      Thuy Humphries is a 52 y.o. female who presents with No chief complaint on file.            Positive ppd          ROS       Objective:     There were no vitals taken for this visit.     Physical Exam            Assessment/Plan:     1. Positive PPD  DX-CHEST-LIMITED (1 VIEW)       No acute cardiac or pulmonary abnormality is identified.

## 2018-04-18 ENCOUNTER — OFFICE VISIT (OUTPATIENT)
Dept: INTERNAL MEDICINE | Facility: MEDICAL CENTER | Age: 53
End: 2018-04-18
Payer: COMMERCIAL

## 2018-04-18 VITALS
DIASTOLIC BLOOD PRESSURE: 102 MMHG | TEMPERATURE: 97.1 F | BODY MASS INDEX: 23.51 KG/M2 | OXYGEN SATURATION: 97 % | SYSTOLIC BLOOD PRESSURE: 164 MMHG | HEART RATE: 82 BPM | HEIGHT: 58 IN | WEIGHT: 112 LBS

## 2018-04-18 DIAGNOSIS — Z12.39 SCREENING FOR BREAST CANCER: ICD-10-CM

## 2018-04-18 DIAGNOSIS — N39.0 URINARY TRACT INFECTION WITHOUT HEMATURIA, SITE UNSPECIFIED: ICD-10-CM

## 2018-04-18 DIAGNOSIS — Z13.0 SCREENING FOR ENDOCRINE, METABOLIC, AND IMMUNITY DISORDER: ICD-10-CM

## 2018-04-18 DIAGNOSIS — Z13.29 SCREENING FOR ENDOCRINE, METABOLIC, AND IMMUNITY DISORDER: ICD-10-CM

## 2018-04-18 DIAGNOSIS — Z13.228 SCREENING FOR ENDOCRINE, METABOLIC, AND IMMUNITY DISORDER: ICD-10-CM

## 2018-04-18 DIAGNOSIS — I10 ESSENTIAL HYPERTENSION: ICD-10-CM

## 2018-04-18 DIAGNOSIS — Z00.00 HEALTH CARE MAINTENANCE: ICD-10-CM

## 2018-04-18 PROCEDURE — 93000 ELECTROCARDIOGRAM COMPLETE: CPT | Mod: GC | Performed by: INTERNAL MEDICINE

## 2018-04-18 PROCEDURE — 99204 OFFICE O/P NEW MOD 45 MIN: CPT | Mod: GC | Performed by: INTERNAL MEDICINE

## 2018-04-18 RX ORDER — LISINOPRIL 10 MG/1
10 TABLET ORAL DAILY
Qty: 30 TAB | Refills: 0 | Status: SHIPPED | OUTPATIENT
Start: 2018-04-18 | End: 2018-05-03 | Stop reason: SDUPTHER

## 2018-04-18 ASSESSMENT — PATIENT HEALTH QUESTIONNAIRE - PHQ9: CLINICAL INTERPRETATION OF PHQ2 SCORE: 0

## 2018-04-18 NOTE — PROGRESS NOTES
New Patient to Establish    Reason to establish:New Patient to Establish primary care    CC: Hypertension    HPI:     Patient is a 53-year-old female [facility] [employer], who does a housekeeping business, is here to establish primary care with us. She was told to have hypertension for past 2 years but could not afford to see a doctor as she did not have insurance, recently she bought her insurance and came for establishing primary care. She was never treated for hypertension.     Currently she does not report any headache, chest pain, shortness of breath, blurry vision, syncopal episodes. She never had heart attack or stroke in the past.     She is a nonsmoker, drinks occasionally one beer per day, never  abused IV drugs, sexually active with one male partner, with whom she is living and she has 2 grownup kids, her daughter  (Dylan) accompanied her today, as patient partly speaks English and Syriac but she can understand.    Health maintenance history; she never had a colonoscopy nor a mammogram . She had a positive PPD in January 2018 at the time of her appointment and at that time chest x-ray was negative and no antitubercular treatment was given.    She wants to have  well woman examination and she did not have her Pap smear for several years, is currently sexually active with one male, and occasionally has vaginal discharge with intermittent dysuria but did not report any fever chills or flank pain or hematuria.        Patient Active Problem List    Diagnosis Date Noted   • Essential hypertension 04/18/2018   • Health care maintenance 04/18/2018       History reviewed. No pertinent past medical history.    Current Outpatient Prescriptions   Medication Sig Dispense Refill   • lisinopril (PRINIVIL) 10 MG Tab Take 1 Tab by mouth every day. 30 Tab 0   • Blood Pressure Monitoring (SPHYGMOMANOMETER) Misc Record BP twice daily 1 Each 0     No current facility-administered medications for this visit.   "      Allergies as of 04/18/2018   • (No Known Allergies)       Social History     Social History   • Marital status: Single     Spouse name: N/A   • Number of children: 2   • Years of education: 9th grade     Occupational History   • Housekeeping       with [employer]     Social History Main Topics   • Smoking status: Never Smoker   • Smokeless tobacco: Never Used   • Alcohol use 4.2 - 8.4 oz/week     7 - 14 Cans of beer per week   • Drug use: No   • Sexual activity: Yes     Partners: Male      Comment: LMP at 49     Other Topics Concern   • Seat Belt Yes     Social History Narrative    She has 2 children and lives with her boy friend    Does Housekeeping business       Family History   Problem Relation Age of Onset   • Diabetes Mother    • Hypertension Mother    • Heart Attack Father    • No Known Problems Sister    • No Known Problems Brother        Past Surgical History:   Procedure Laterality Date   • GYN SURGERY      tubal ligation       ROS: As per HPI. Additional pertinent symptoms as noted below.    Negative for swelling of the feet  Negative for shortness of breath  Negative for chest pain  Negative for stroke  Negative for bleeding disorders    BP (!) 164/102   Pulse 82   Temp 36.2 °C (97.1 °F)   Ht 1.473 m (4' 10\")   Wt 50.8 kg (112 lb)   SpO2 97%   BMI 23.41 kg/m²     Physical Exam    General:  Thinly built female, afebrile, anicteric,   Alert and oriented, No apparent distress.    Eyes: Pupils equal and reactive. No scleral icterus.    Throat: Clear no erythema or exudates noted.    Neck: Supple. No lymphadenopathy noted. Thyroid not enlarged.    Lungs: Clear to auscultation and percussion bilaterally.    Cardiovascular: Regular rate and rhythm. No murmurs, rubs or gallops.    Abdomen:  Benign. No rebound or guarding noted.    Extremities: No clubbing, cyanosis, edema.    Skin: Clear. No rash or suspicious skin lesions noted.    Other: Mood and affect normal,  no neurologic " deficits      Assessment and Plan    1. Essential hypertension  Patient with history of hypertension but never been treated  No signs of end organ damage  EKG normal sinus rhythm, heart rate 81 per minute  No ST-T changes  No chamber hypertrophy  QTc interval 444 ms    Plan  Patient advised to bring blood pressure log for 2 weeks  In the interim we will start with her lisinopril 10 mg once daily  Will consider adding diuretic in subsequent visit if blood pressure remains high  Will obtain microalbumin creatinine ratio, , CBC and chem panel    2. Health care maintenance  Patient is due for colonoscopy, mammogram, Pap smear exam  Referrral given for GI for screening colonoscopy and for mammogram  She is advised about feeling well woman clinic for Pap smear exam    3. Urinary tract infection without hematuria, site unspecified  History of vaginal discharge and occasional intermittent dysuria, but declined any fever or flank pain or hematuria  Sexually active with one male partner  No suprapubic tenderness  No costovertebral tenderness  Patient advised for follow-up in well woman clinic, in the interim we will get her urinalysis done    4. Screening for breast cancer  Mammogram ordered    5. Screening for endocrine, metabolic, and immunity disorder  Order TSH lipid panel and hemoglobin A1c      Risk Assessment (discuss potential complications a function of chronic problems): As above    Complexity (discuss number of co-morbidities): As above    Signed by: Vijay Fernández M.D.

## 2018-04-18 NOTE — PATIENT INSTRUCTIONS
Hipertensión  (Hypertension)  El término hipertensión es otra forma de denominar a la presión arterial elevada. La presión arterial elevada fuerza al corazón a trabajar más para bombear la julia. Juan lectura de la presión arterial consta de dos números: avery más alto sobre avery más bajo (por ejemplo, 110/72).  CUIDADOS EN EL HOGAR  · Pretty que el médico le tome nuevamente la presión arterial.  · [place] los medicamentos solamente radha se lo haya indicado el médico. Siga cuidadosamente las indicaciones. Los medicamentos pierden eficacia si omite dosis. El hecho de omitir las dosis también aumenta el riesgo de otros problemas.  · No fume.  · Contrólese la presión arterial en daly casa radha se lo haya indicado el médico.  SOLICITE AYUDA SI:  · Piensa que tiene juan reacción a los medicamentos que está tomando.  · Tiene mareos o sandi de ricardo reiterados.  · Se le inflaman (hinchan) los tobillos.  · Tiene problemas de visión.  SOLICITE AYUDA DE INMEDIATO SI:  · Tiene un dolor de ricardo muy intenso y está confundido.  · Se siente débil, aturdido o se desmaya.  · Tiene dolor en el pecho o el estómago (abdominal).  · Tiene vómitos.  · No puede respirar muy diego.  ASEGÚRESE DE QUE:  · Comprende estas instrucciones.  · Controlará daly afección.  · Recibirá ayuda de inmediato si no mejora o si empeora.  Esta información no tiene radha fin reemplazar el consejo del médico. Asegúrese de hacerle al médico cualquier pregunta que tenga.  Document Released: 06/07/2011 Document Revised: 12/23/2014 Document Reviewed: 10/10/2014  Elsevier Interactive Patient Education © 2017 Elsevier Inc.

## 2018-04-23 ENCOUNTER — OFFICE VISIT (OUTPATIENT)
Dept: INTERNAL MEDICINE | Facility: MEDICAL CENTER | Age: 53
End: 2018-04-23
Payer: COMMERCIAL

## 2018-04-23 VITALS
HEIGHT: 58 IN | DIASTOLIC BLOOD PRESSURE: 120 MMHG | HEART RATE: 91 BPM | WEIGHT: 114 LBS | OXYGEN SATURATION: 97 % | BODY MASS INDEX: 23.93 KG/M2 | SYSTOLIC BLOOD PRESSURE: 166 MMHG

## 2018-04-23 DIAGNOSIS — Z01.411 ENCOUNTER FOR GYNECOLOGICAL EXAMINATION WITH ABNORMAL FINDING: ICD-10-CM

## 2018-04-23 DIAGNOSIS — Z12.4 ROUTINE CERVICAL SMEAR: ICD-10-CM

## 2018-04-23 PROCEDURE — 99000 SPECIMEN HANDLING OFFICE-LAB: CPT | Performed by: INTERNAL MEDICINE

## 2018-04-23 PROCEDURE — 99396 PREV VISIT EST AGE 40-64: CPT | Performed by: INTERNAL MEDICINE

## 2018-04-23 NOTE — ASSESSMENT & PLAN NOTE
Patient here for Pap and pelvic exam. Previous pelvic exams have all been normal.  Patient denies any current symptoms: No dyspareunia, no discharge, no lesions.  Patient denies family history of GYN or breast cancers.   Patient has had previous pregnancies, without significant complications.  Patient's menstrual history is unremarkable.  Patient is not  in need of contraception at this time.  Patient denies any STD risks or concerns.   Patient denies any history of sexual abuse.   Patient denies breast masses or lesions .  Pt does have stress incont.

## 2018-04-23 NOTE — PROGRESS NOTES
"          Established Patient    Thuy Humphries is a 53 y.o. female who presents today with the following:    CC: Pap, pelvic, breast exam- and f/u htn Daughter is  per pt request.     HPI: has not had labs or started BP med    Routine cervical smear   Patient here for Pap and pelvic exam. Previous pelvic exams have all been normal.  Patient denies any current symptoms: No dyspareunia, no discharge, no lesions.  Patient denies family history of GYN or breast cancers.   Patient has had previous pregnancies, without significant complications.  Patient's menstrual history is unremarkable.  Patient is not  in need of contraception at this time.  Patient denies any STD risks or concerns.   Patient denies any history of sexual abuse.   Patient denies breast masses or lesions .  Pt does have stress incont.          ROS: As per HPI. Additional pertinent symptoms as noted below. All other systems reviewed and are negative.    ROS    Patient Active Problem List    Diagnosis Date Noted   • Routine cervical smear 04/23/2018   • Essential hypertension 04/18/2018   • Health care maintenance 04/18/2018       Social History   Substance Use Topics   • Smoking status: Never Smoker   • Smokeless tobacco: Never Used   • Alcohol use 4.2 - 8.4 oz/week     7 - 14 Cans of beer per week       Current Outpatient Prescriptions   Medication Sig Dispense Refill   • lisinopril (PRINIVIL) 10 MG Tab Take 1 Tab by mouth every day. 30 Tab 0   • Blood Pressure Monitoring (SPHYGMOMANOMETER) Misc Record BP twice daily 1 Each 0     No current facility-administered medications for this visit.        BP (!) 166/120   Pulse 91   Ht 1.473 m (4' 10\")   Wt 51.7 kg (114 lb)   SpO2 97%   BMI 23.83 kg/m²     Physical Exam  General:   No distress.  Normal appearing.  HEENT: Pupils are equal.  Conjunctiva is normal.  Head is normal appearing.   Pulmonary: Clear to auscultation, with good breath sounds.  Cardiovascular regular rate and " rhythm.  No murmur auscultated. No carotid bruits.  Abdomen: Soft, nontender, nondistended.  Normal bowel sounds.  Organs are not enlarged.  Neurologic: Cranial nerves intact.  Strength and sensation are normal.  Skin: No obvious lesions.  Lymph: No cervical, supraclavicular, axillary nodes noted.  Genitourinary: External genitalia are normal in appearance. Speculum exam- normal cervix and mucosa.(slight atrophy- pale mucosa. There is a cystocel present.  Pap collected , normal, mobile, non-tender uterus, no adnexal masses.  Breast: Bilateral breasts are normal in appearance. Nipple and areola are normal in appearance. No abnormal skin texture. No masses .      Assessment and Plan    Recent labs were reviewed.  Recent imaging was reviewed  Consultant notes since last visit here were reviewed.       1. Routine cervical smear  No STD concerns, no need for family planing (post menopausal), discussed SBE- rec getting mammo (ordered)    2. Encounter for gynecological examination with abnormal finding  cystocele- and hx incont. Discussed kegal exercises with daughter. Can consider PT for pelvic floor if needed.       No Follow-up on file.      This note was created using voice recognition software. There may be unintended errors in spelling, grammar or content.    Breast, pap and pelvic done by myself (pt preference not to have male doctor)

## 2018-04-23 NOTE — PATIENT INSTRUCTIONS
Please get your Labs tested today and go to Pharmacy and get your Blood pressure medications started right away from today    Please come back in a week with all BP log as instructed.

## 2018-04-23 NOTE — PROGRESS NOTES
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       She was told to have hypertension for past 2 years but could not afford to see a doctor as she did not have insurance, in the past . In her previous visit we found her BP to be elevated and advised starting Lisinopril 10 mg, and ordered the Labs at the same time.  She was asked to come for Well woman exam today, for which she is here. Unfortunately the daughter says patient did not start any medications as she was pre-occupied with drinking alcohol, nor got the labs drawn.    She also stated her Mom drinks 1-4 cans of beer daily, but does have had several months of sobriety in past. Her last drink was on Friday (3 days ago), never had withdrawal problems, or seizures, nor any intoxication related hospitalizations.     Currently she does not report any headache, chest pain, shortness of breath, blurry vision, syncopal episodes. She never had heart attack or stroke in the past.      She is a nonsmoker, never  abused IV drugs, sexually active with one male partner, with whom she is living and she has 2 grownup kids, her daughter  (Dylan) accompanied her today, as patient partly speaks English and Kinyarwanda but she can understand well my conversations.     Health maintenance history; she never had a colonoscopy nor a mammogram . I have ordered these tests in previous visit. She had a positive PPD in January 2018 at the time of her appointment and at that time chest x-ray was negative and no antitubercular treatment was given.     She wants to have  well woman examination done today which Dr Mohr has done seperately, as she preferred female examiner.           Patient Active Problem List    Diagnosis Date Noted   • Routine  "cervical smear 04/23/2018   • Essential hypertension 04/18/2018   • Health care maintenance 04/18/2018       Current Outpatient Prescriptions   Medication Sig Dispense Refill   • lisinopril (PRINIVIL) 10 MG Tab Take 1 Tab by mouth every day. 30 Tab 0   • Blood Pressure Monitoring (SPHYGMOMANOMETER) Misc Record BP twice daily 1 Each 0     No current facility-administered medications for this visit.        ROS: As per HPI. Additional pertinent symptoms as noted below.    Neagtive for  any headache, chest pain, shortness of breath, blurry vision, syncopal episodes.      BP (!) 166/120   Pulse 91   Ht 1.473 m (4' 10\")   Wt 51.7 kg (114 lb)   SpO2 97%   BMI 23.83 kg/m²     Physical Exam      General:  Thinly built female, afebrile, anicteric,   Alert and oriented, No apparent distress.   Eyes: Pupils equal and reactive. No scleral icterus   Throat: Clear no erythema or exudates noted.   Neck: Supple. No lymphadenopathy noted. Thyroid not enlarged.   Lungs: Clear to auscultation and percussion bilaterally.   Cardiovascular: Regular rate and rhythm. No murmurs, rubs or gallops.   Abdomen:  Benign. No rebound or guarding noted.   Extremities: No clubbing, cyanosis, edema.   Skin: Clear. No rash or suspicious skin lesions noted.   Other: Mood and affect normal,  no neurologic deficits    Breast and GenitoUrinary exam: done seperately by Dr Mohr        Assessment and Plan    1. Essential hypertension  Patient with history of hypertension but never been treated  No signs of end organ damage  EKG in previous visit showed    normal sinus rhythm, heart rate 81 per minute, No ST-T changes  No chamber hypertrophy  QTc interval 444 ms     Plan  Patient extensively counseled for >30 minutes regarding hazards of Hypertension leaving untreated and advised to start the treatment immediately.    Made a personal phone call to Pharmacist who acknowledged the medications are ready for  and will administer Lisinopril 10 mg in " his presence when she reaches pharmacy in few minutes accompanied with her daughter.    Patient also ready to buy the BP instrument and bring the log of recordings within one week and follow up with me.    In the interim we will start with her lisinopril 10 mg once daily  Will consider adding diuretic in subsequent visit if blood pressure remains high    Will obtain microalbumin creatinine ratio, , CBC and chem panel      Alcohol use disroder:    Patient extensively counseled for regarding hazards of Alcohol abuse and provided list of  various treatment centers for counseling and rehabilitation.    She has assured remaining sober and follow up in a week .    For well women exam findings please see Dr Mohr's addendum              Signed by: Vijay Fernández M.D.

## 2018-04-30 DIAGNOSIS — Z13.29 SCREENING FOR ENDOCRINE, METABOLIC, AND IMMUNITY DISORDER: ICD-10-CM

## 2018-04-30 DIAGNOSIS — Z00.00 HEALTH CARE MAINTENANCE: ICD-10-CM

## 2018-04-30 DIAGNOSIS — N39.0 URINARY TRACT INFECTION WITHOUT HEMATURIA, SITE UNSPECIFIED: ICD-10-CM

## 2018-04-30 DIAGNOSIS — I10 ESSENTIAL HYPERTENSION: ICD-10-CM

## 2018-04-30 DIAGNOSIS — Z13.0 SCREENING FOR ENDOCRINE, METABOLIC, AND IMMUNITY DISORDER: ICD-10-CM

## 2018-04-30 DIAGNOSIS — Z13.228 SCREENING FOR ENDOCRINE, METABOLIC, AND IMMUNITY DISORDER: ICD-10-CM

## 2018-05-02 DIAGNOSIS — R87.611 ATYPICAL SQUAMOUS CELLS CANNOT EXCLUDE HIGH GRADE SQUAMOUS INTRAEPITHELIAL LESION ON CYTOLOGIC SMEAR OF CERVIX (ASC-H): ICD-10-CM

## 2018-05-02 DIAGNOSIS — Z12.4 ROUTINE CERVICAL SMEAR: ICD-10-CM

## 2018-05-03 ENCOUNTER — OFFICE VISIT (OUTPATIENT)
Dept: INTERNAL MEDICINE | Facility: MEDICAL CENTER | Age: 53
End: 2018-05-03

## 2018-05-03 VITALS
OXYGEN SATURATION: 100 % | SYSTOLIC BLOOD PRESSURE: 128 MMHG | HEIGHT: 58 IN | HEART RATE: 97 BPM | DIASTOLIC BLOOD PRESSURE: 94 MMHG | TEMPERATURE: 98.5 F | WEIGHT: 113.38 LBS | BODY MASS INDEX: 23.8 KG/M2

## 2018-05-03 DIAGNOSIS — R87.611 ATYPICAL SQUAMOUS CELLS CANNOT EXCLUDE HIGH GRADE SQUAMOUS INTRAEPITHELIAL LESION ON CYTOLOGIC SMEAR OF CERVIX (ASC-H): ICD-10-CM

## 2018-05-03 DIAGNOSIS — I10 ESSENTIAL HYPERTENSION: ICD-10-CM

## 2018-05-03 PROCEDURE — 99214 OFFICE O/P EST MOD 30 MIN: CPT | Mod: GC | Performed by: INTERNAL MEDICINE

## 2018-05-03 NOTE — PROGRESS NOTES
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 Finally she got the Labs done which show Normal CBC, CMP including AST, ALT, UA normal, Micr/Alb Urinary cr ratio normal.    She has hx of Alcohol use disorder and was counseled for avoiding alcohol binge drinking. Today she was again extensively counseled through  the translation service, she endorses to completely cut down her alcohol    She has started taking Lisnopril since past one week, and her BP now is 132/98 initially which came down to 128/94. She reports occasional cough but it is not disturbing her sleep nor her work performance. She was given the option of choosing to continue on or go over to losartan patient wants to try 2 tablets of 10 mg (20 mg dose)  Lisinopril first    Her home recording log she was advised but did not bring, she will bring in the subsequent visit in 10 days    For additional details please see my notes of office visits on 4/18/2018, and 4/23            Patient Active Problem List    Diagnosis Date Noted   • Atypical squamous cells cannot exclude high grade squamous intraepithelial lesion on cytologic smear of cervix (ASC-H) 05/02/2018   • Routine cervical smear 04/23/2018   • Essential hypertension 04/18/2018   • Health care maintenance 04/18/2018       Current Outpatient Prescriptions   Medication Sig Dispense Refill   • lisinopril (PRINIVIL) 10 MG Tab Take 1 Tab by mouth every day. 30 Tab 0   • Blood Pressure Monitoring (SPHYGMOMANOMETER) Misc Record BP twice daily 1 Each 0     No current facility-administered medications for  "this visit.        ROS: As per HPI. Additional pertinent symptoms as noted below.    Negative for chest pain, palpitations, shortness of breath, dizziness  positive for dry cough    /94   Pulse 97   Temp 36.9 °C (98.5 °F)   Ht 1.473 m (4' 10\")   Wt 51.4 kg (113 lb 6 oz)   SpO2 100%   BMI 23.70 kg/m²     Physical Exam   Constitutional:  oriented to person, place, and time. No distress.   Eyes: Pupils are equal, round, and reactive to light. No scleral icterus.  Neck: Neck supple. No thyromegaly present.   Cardiovascular: Normal rate, regular rhythm and normal heart sounds.  Exam reveals no gallop and no friction rub.  No murmur heard.  Pulmonary/Chest: Breath sounds normal. Chest wall is not dull to percussion.   Musculoskeletal:   no edema.   Lymphadenopathy: no cervical adenopathy  Neurological: alert and oriented to person, place, and time.   no neurologic deficits  Skin: No cyanosis. Nails show no clubbing.  Other: Mood and affect normal      Assessment and Plan    1. Essential hypertension  Patient recently found to be hypertensive in past 2 visits  Taking Lisnopril since past one week,   BP now is 132/98 initially which came down to 128/94.   Reports occasional cough but it is not disturbing her sleep nor her work performance.     Plan:  She was given the option of choosing to continue on or go over to losartan.  Patient wants to try 2 tablets of 10 mg (20 mg dose)  Lisinopril first, and come back with BP log in 10 days    Patient verbalized clear understanding, that she might become hypotensive with 2 tablets. If she  feels dizzy, she should go back to 10 mg and call us back if she has any symptoms of dizziness or postural hypotension.    She will follow-up with us in 10 days. If her cough worsens we will switch over to losartan 25 mg,     2. Atypical squamous cells cannot exclude high grade squamous intraepithelial lesion on cytologic smear of cervix (ASC-H)  Last Pap Smear showed " abnormality  Referral given by Dr Mohr for OBGyn      Signed by: Vijay Fernández M.D..

## 2018-05-04 ENCOUNTER — TELEPHONE (OUTPATIENT)
Dept: INTERNAL MEDICINE | Facility: MEDICAL CENTER | Age: 53
End: 2018-05-04

## 2018-05-04 RX ORDER — LISINOPRIL 10 MG/1
20 TABLET ORAL DAILY
Qty: 60 TAB | Refills: 1 | Status: SHIPPED | OUTPATIENT
Start: 2018-05-04 | End: 2018-05-16 | Stop reason: SDUPTHER

## 2018-05-04 NOTE — TELEPHONE ENCOUNTER
----- Message from Sandy Quesada, Med Ass't sent at 5/4/2018 10:06 AM PDT -----  Can do an encounter for the pap result please.

## 2018-05-16 ENCOUNTER — OFFICE VISIT (OUTPATIENT)
Dept: INTERNAL MEDICINE | Facility: MEDICAL CENTER | Age: 53
End: 2018-05-16

## 2018-05-16 VITALS
HEART RATE: 77 BPM | OXYGEN SATURATION: 97 % | DIASTOLIC BLOOD PRESSURE: 93 MMHG | TEMPERATURE: 98 F | SYSTOLIC BLOOD PRESSURE: 168 MMHG | HEIGHT: 58 IN | WEIGHT: 114 LBS | BODY MASS INDEX: 23.93 KG/M2

## 2018-05-16 DIAGNOSIS — I10 ESSENTIAL HYPERTENSION: ICD-10-CM

## 2018-05-16 DIAGNOSIS — R87.611 ATYPICAL SQUAMOUS CELLS CANNOT EXCLUDE HIGH GRADE SQUAMOUS INTRAEPITHELIAL LESION ON CYTOLOGIC SMEAR OF CERVIX (ASC-H): ICD-10-CM

## 2018-05-16 PROCEDURE — 99214 OFFICE O/P EST MOD 30 MIN: CPT | Mod: GC | Performed by: HOSPITALIST

## 2018-05-16 RX ORDER — LISINOPRIL 10 MG/1
20 TABLET ORAL DAILY
Qty: 60 TAB | Refills: 1 | Status: SHIPPED | OUTPATIENT
Start: 2018-05-16

## 2018-05-16 NOTE — PATIENT INSTRUCTIONS
Enfermedad hepática por alcoholismo  (Alcoholic Liver Disease)  La enfermedad hepática por alcoholismo ocurre cuando el hígado no funciona correctamente. La causa de esta enfermedad es el consumo excesivo de alcohol cedric muchos años.  CUIDADOS EN EL HOGAR  · No leeroy alcohol.  · [place] los medicamentos solamente radha se lo haya indicado el médico.  · Magazine las vitaminas solamente radha se lo haya indicado el médico.  · Siga las indicaciones que el médico le shadi respecto de la dieta. Puede ser necesario que:  ¨ Consuma alimentos que contengan tiamina, entre ellos, cereales integrales, cerdo y verduras crudas.  ¨ Consuma alimentos con ácido fólico, entre ellos, verduras, frutas, pastora, frijoles, johan secos y productos lácteos.  ¨ Consuma alimentos con alto contenido de carbohidratos, entre ellos, yogur, frijoles, patatas y arroz.  SOLICITE AYUDA SI:  · Tiene fiebre.  · Comienza a sentir falta de aire.  · Tiene dificultad para respirar.  · Observa julia de color haro brillante en las heces (materia fecal).  · Las heces parecen alquitranadas.  · Vomita julia.  · La piel se le torna de un color más amarillento, pálida u oscura.  · Tiene sandi de ricardo.  · Tiene dificultad para pensar.  · Tiene problemas de equilibrio o para caminar.  Esta información no tiene radha fin reemplazar el consejo del médico. Asegúrese de hacerle al médico cualquier pregunta que tenga.  Document Released: 01/20/2012 Document Revised: 05/03/2016 Document Reviewed: 11/19/2015  Elsevier Interactive Patient Education © 2017 Elsevier Inc.

## 2018-05-16 NOTE — PROGRESS NOTES
Established Patient    Thuy presents today with the following:    CC: Follow-up of newly diagnosed hypertension and binge drinking behavior    HPI:     53-year-old [place] American, established patient of mine whom I have been following up for hypertension newly diagnosed, and alcoholism, she was started on lisinopril 10 mg increased to 20 mg and advised to bring the blood pressure log. The BP recordings show gradually declining and ranging between 90//80    She has substantially cut down her alcohol binge drinking behavior; Her daughter Ale who accompanied her today also endorsed the patient has rarely been drinking now. Occasionally she feels headache when her blood pressure is low, however she declined any history of dizziness or syncopal episodes or palpitations.    Health maintenance  We  performed her Pap smear in [facility], which shows ASCUS, for which we referred her to OB/GYN. Her insurance has recently approved consult with OB/GYN.     Referral was also given for GI for screening colonoscopy and for mammogram, which are pending.        Patient Active Problem List    Diagnosis Date Noted   • Atypical squamous cells cannot exclude high grade squamous intraepithelial lesion on cytologic smear of cervix (ASC-H) 05/02/2018   • Routine cervical smear 04/23/2018   • Essential hypertension 04/18/2018   • Health care maintenance 04/18/2018       Current Outpatient Prescriptions   Medication Sig Dispense Refill   • lisinopril (PRINIVIL) 10 MG Tab Take 2 Tabs by mouth every day. 60 Tab 1   • Blood Pressure Monitoring (SPHYGMOMANOMETER) Misc Record BP twice daily 1 Each 0     No current facility-administered medications for this visit.        ROS: As per HPI. Additional pertinent symptoms as noted below.    Negative for chest pain  Negative for shortness of breath  Negative for syncopal episodes  Negative for hematemesis or melena    BP (!) 168/93   Pulse 77   Temp 36.7 °C (98 °F)   Ht  "1.473 m (4' 10\")   Wt 51.7 kg (114 lb)   SpO2 97%   BMI 23.83 kg/m²     Physical Exam   Constitutional:  Thinly built female, afebrile, anicteric,   oriented to person, place, and time. No distress.   Eyes: Pupils are equal, round, and reactive to light. No scleral icterus.  Neck: Neck supple. No thyromegaly present.   Cardiovascular: Normal rate, regular rhythm and normal heart sounds.  Exam reveals no gallop and no friction rub.  No murmur heard.  Pulmonary/Chest: Breath sounds normal. Chest wall is not dull to percussion.  Abdominal exam negative for hepatosplenomegaly   Musculoskeletal:   no pedal edema.   Lymphadenopathy: no cervical adenopathy  Neurological: alert and oriented to person, place, and time.   Skin: No cyanosis. Nails show no clubbing.  Other: Mood and affect normal      Assessment and Plan    1. Essential hypertension  53-year-old female newly diagnosed with severe hypertension (166/120), in the previous visit her lisinopril was titrated up from 10-to 20 mg.     However in the current visit her recordings show blood pressure has been gradually declining and ranging between 90//80, this is possibly due to her substantial reduced intake of alcohol.    Her previous laboratory workup showed normal renal function, and normal K at 4.2    Plan  Patient is advised to take only 10 mg of lisinopril, as 20 mg might precipitate further decline in blood pressure and dizziness.    Continual blood pressure recordings at least twice a week and follow up in 2-3 months with me. Patient and her daughter verbalize understanding,     2. Alcohol use disorder;  Patient as well as her daughter reported cutting down binge drinking behavior substantially. Her  and family members who are very supportive of her, he does not drink either. She has assured me in the future visit she will be at zero alcohol intake. We re-enforced and and  Encouraged complete abstinence.    3. Health maintenance  We  performed " her Pap smear in previous [facility], which shows ASCUS, for which we referred her to OB/GYN. Her insurance has recently approved consult with OB/GYN.     Referral was also given for GI for screening colonoscopy and for mammogram, which are pending.              Signed by: Vijay Fernández M.D.

## 2018-05-23 ENCOUNTER — HOSPITAL ENCOUNTER (OUTPATIENT)
Dept: RADIOLOGY | Facility: MEDICAL CENTER | Age: 53
End: 2018-05-23

## 2018-05-29 ENCOUNTER — HOSPITAL ENCOUNTER (OUTPATIENT)
Dept: RADIOLOGY | Facility: MEDICAL CENTER | Age: 53
End: 2018-05-29
Attending: STUDENT IN AN ORGANIZED HEALTH CARE EDUCATION/TRAINING PROGRAM

## 2018-05-29 DIAGNOSIS — Z12.39 SCREENING FOR BREAST CANCER: ICD-10-CM

## 2018-05-29 DIAGNOSIS — Z00.00 HEALTH CARE MAINTENANCE: ICD-10-CM

## 2018-05-29 DIAGNOSIS — I10 ESSENTIAL HYPERTENSION: ICD-10-CM

## 2018-05-29 PROCEDURE — 77067 SCR MAMMO BI INCL CAD: CPT

## 2018-09-06 ENCOUNTER — NON-PROVIDER VISIT (OUTPATIENT)
Dept: URGENT CARE | Facility: PHYSICIAN GROUP | Age: 53
End: 2018-09-06

## 2018-09-06 DIAGNOSIS — Z02.1 PRE-EMPLOYMENT DRUG SCREENING: Primary | ICD-10-CM

## 2018-09-06 LAB
AMP AMPHETAMINE: NORMAL
COC COCAINE: NORMAL
INT CON NEG: NORMAL
INT CON POS: NORMAL
MET METHAMPHETAMINES: NORMAL
OPI OPIATES: NORMAL
PCP PHENCYCLIDINE: NORMAL
POC DRUG COMMENT 753798-OCCUPATIONAL HEALTH: NEGATIVE
THC: NORMAL

## 2018-09-06 PROCEDURE — 80305 DRUG TEST PRSMV DIR OPT OBS: CPT | Performed by: PHYSICIAN ASSISTANT

## 2018-09-07 NOTE — PROGRESS NOTES
Thuy Humphries is a 53 y.o. female here for a non-provider visit for Drug Screen/[employer]/Pre-employment     If abnormal was an in office provider notified today (if so, indicate provider)? No  Routed to PCP? No